# Patient Record
(demographics unavailable — no encounter records)

---

## 2025-03-13 NOTE — DATA REVIEWED
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

## 2025-03-13 NOTE — ASSESSMENT
[FreeTextEntry1] : Plan: - Summary : Plan includes a treatment trial with Gabapentin and Celebrex, with careful dosage adjustments to prevent unwanted side effects such as fatigue. Most likely spinal stenosis secondary to multilevel degenerative disease with L2-L3 radicular symptoms resting from lateral recess stenosis had extensive conversation with patient about her about management and workup for this condition.  Proceed with the following plan. - Plan : - Start with Gabapentin at night - gradually increasing up to 300mg,300mg,600mg. Add Celebrex 100mg twice daily as needed.  - Physical therapy   - Consideration of injections or surgical interventions if needed.  Ashwin Lyn DO, FAAPMR Attending Physician, Interventional Pain Medicine  Department of Physical Medicine and Rehabilitation [facility] | [place] [address] [address] [place] [address] [address] [place] [address] Email: [email or web address]   I have spent greater than 60 minutes preparing to see the patient, collecting relevant history, performing a thorough history and physical examination, counseling the patient regarding my findings ordering the appropriate therapies and tests, communicating with other relevant healthcare professionals, documenting my encounter and coordinating care.

## 2025-03-13 NOTE — ASSESSMENT
[FreeTextEntry1] : Plan: - Summary : Plan includes a treatment trial with Gabapentin and Celebrex, with careful dosage adjustments to prevent unwanted side effects such as fatigue. Most likely spinal stenosis secondary to multilevel degenerative disease with L2-L3 radicular symptoms resting from lateral recess stenosis had extensive conversation with patient about her about management and workup for this condition.  Proceed with the following plan. - Plan : - Start with Gabapentin at night - gradually increasing up to 300mg,300mg,600mg. Add Celebrex 100mg twice daily as needed.  - Physical therapy   - Consideration of injections or surgical interventions if needed.  Ashwin Lyn DO, FAAPMR Attending Physician, Interventional Pain Medicine  Department of Physical Medicine and Rehabilitation [facility] | [place] [address] [address] [place] [address] [address] [place] [address] Email: [email or web address]   I have spent greater than 60 minutes preparing to see the patient, collecting relevant history, performing a thorough history and physical examination, counseling the patient regarding my findings ordering the appropriate therapies and tests, communicating with other relevant healthcare professionals, documenting my encounter and coordinating care.

## 2025-03-13 NOTE — PHYSICAL EXAM
[FreeTextEntry1] : A+O x 3 in NAD Psych: Normal mood and affect. Responds appropriately to commands Eyes: Anicteric. No discharge. EOMI. Resp: Breathing unlabored CV: Well Perfused Ext: No c/c/e Skin: No lesions noted Gait: Non antalgic, normal reciprocating heel to toe, able to stand on toes and heels. Tandem gait intact Negative romberg Stance: No Trendelenburg sign present with single leg stance   Neuro:  Tone: Normal. No clonus. Sensation: Grossly intact to light touch and pinprick bilateral lower limbs. Proprioception: Intact at big toes bilaterally. Reflexes: 2+ symmetric knee jerk, medial hamstring, ankle jerk. Plantars downgoing bilaterally.  MMT:  5/5 in b/l lower extremities   Spine:  Inspection: Alignment is midline, with no evidence of scoliosis. Iliac crest heights and PSIS heights level. No rib hump noted upon forward flexion of the trunk. + Shopping cart sign  Palpation: There is + tenderness over the midline spinous processes, paravertebral muscles, PSIS, Neg TTP over sciatic notch, greater trochanters bilaterally.  Lumbar ROM: Flexion > extension, side-bending, rotation, limited in most planes pain with lateral flexion pain with oblique extension pain with lateral rotation , pain on extension   Hip ROM:  -Pain at terminal ROM bilaterally.  -FAIR, FABERE negative bilaterally.  -SLR positive  -Crossed SLR negative.  -Slump test negative.  -Femoral Nerve Stretch test negative bilaterally  -Crossed Femoral Nerve test negative bilaterally  -Facet loading (Kemps Test) did not produce low back pain.  -Midline Sacral Thrust did not produce pain.  -Standing Flexion Test positive  -Seated Flexion Test negative  -SIJ pain not elicited with dropping the involved leg off the table while the contralateral hip was held in flexion (Gaenslens Test)  - Negative Hip extension and ilium rotation (Yeomans Test)  - Negative Iliac Compression and Distraction of the ASIS  -Maddy Finger Test to SIJ Negative on two attempts.

## 2025-03-13 NOTE — HISTORY OF PRESENT ILLNESS
[Back] : back [9] : a maximum pain level of 9/10 [FreeTextEntry1] : Subjective: - Summary : Patient complains of chronic pain and tingling sensation located in a specific area for the duration of a month. Pain depicted to escalate while the patient is lying down. No known severe allergy or heart problems. Currently not on any prescribed medication related to the present condition. - Chief Complaint (CC) : Chronic pain and tingling sensation for a month. - History of Present Illness (HPI) : Patient reports a experiencing a tingling sensation and pain in a specific area for more than a month. The pain is described as more severe when the patient is lying down. No related aggravating factors or relieving factors noted. Pain radiates into left anterior thigh - Past Medical History : Past medical history was not significantly discussed during the visit. - Past Surgical History : No past surgical history was mentioned during the visit. - Family History : No significant familial health-related information was discussed during this consultation. - Social History : The patient expressed a desire to return to work, indicating socio-economic concerns. No other social history was explicitly mentioned. - Review of Systems : Review of systems was not explicitly discussed during the consultation. - Medications : The patient is not yet on any medications.

## 2025-04-15 NOTE — HISTORY OF PRESENT ILLNESS
[Back] : back [7] : a minimum pain level of 7/10 [8] : a maximum pain level of 8/10 [FreeTextEntry1] : Patient presents today for follow-up she feels that she is overall improved she is able to ambulate more found Celebrex helpful felt dizzy with gabapentin only completed 1 session of physical therapy.  Does feel that she is overall improving.
